# Patient Record
Sex: FEMALE | Race: WHITE | URBAN - METROPOLITAN AREA
[De-identification: names, ages, dates, MRNs, and addresses within clinical notes are randomized per-mention and may not be internally consistent; named-entity substitution may affect disease eponyms.]

---

## 2020-03-10 ENCOUNTER — OFFICE VISIT (OUTPATIENT)
Dept: PRIMARY CARE CLINIC | Age: 55
End: 2020-03-10
Payer: MEDICARE

## 2020-03-10 VITALS
OXYGEN SATURATION: 99 % | DIASTOLIC BLOOD PRESSURE: 81 MMHG | HEART RATE: 78 BPM | BODY MASS INDEX: 31.69 KG/M2 | TEMPERATURE: 99.2 F | SYSTOLIC BLOOD PRESSURE: 151 MMHG | WEIGHT: 190.2 LBS | HEIGHT: 65 IN

## 2020-03-10 PROCEDURE — 99202 OFFICE O/P NEW SF 15 MIN: CPT | Performed by: FAMILY MEDICINE

## 2020-03-10 RX ORDER — LISINOPRIL 20 MG/1
1 TABLET ORAL DAILY
COMMUNITY
Start: 2020-02-28 | End: 2020-03-10 | Stop reason: SDUPTHER

## 2020-03-10 RX ORDER — LISINOPRIL 20 MG/1
20 TABLET ORAL DAILY
Qty: 90 TABLET | Refills: 1 | Status: SHIPPED
Start: 2020-03-10 | End: 2020-03-17 | Stop reason: SINTOL

## 2020-03-10 ASSESSMENT — PATIENT HEALTH QUESTIONNAIRE - PHQ9
SUM OF ALL RESPONSES TO PHQ QUESTIONS 1-9: 0
SUM OF ALL RESPONSES TO PHQ QUESTIONS 1-9: 0
2. FEELING DOWN, DEPRESSED OR HOPELESS: 0
1. LITTLE INTEREST OR PLEASURE IN DOING THINGS: 0
SUM OF ALL RESPONSES TO PHQ9 QUESTIONS 1 & 2: 0

## 2020-03-10 NOTE — PROGRESS NOTES
accurate reading of BP. She just started on lisinopril which I have refilled and will have her follow up in 1 month to re-evaluate. We discussed labs today but she declined stating she would prefer to have them done at next visit. - lisinopril (PRINIVIL;ZESTRIL) 20 MG tablet; Take 1 tablet by mouth daily  Dispense: 90 tablet; Refill: 1    2. Situational anxiety  - Discussed options for treatment for her anxiety all of which she declined. Return in about 4 weeks (around 4/7/2020) for WWE/physical with fasting labs (AM appt).           [address], DO

## 2020-03-11 ASSESSMENT — ENCOUNTER SYMPTOMS
NAUSEA: 0
VOMITING: 0
SHORTNESS OF BREATH: 0
ABDOMINAL PAIN: 0
DIARRHEA: 0

## 2020-03-17 RX ORDER — AMLODIPINE BESYLATE 5 MG/1
5 TABLET ORAL DAILY
Qty: 30 TABLET | Refills: 2 | Status: SHIPPED | OUTPATIENT
Start: 2020-03-17 | End: 2020-05-14 | Stop reason: SDUPTHER

## 2020-03-19 ENCOUNTER — OFFICE VISIT (OUTPATIENT)
Dept: PRIMARY CARE CLINIC | Age: 55
End: 2020-03-19
Payer: MEDICARE

## 2020-03-19 VITALS — DIASTOLIC BLOOD PRESSURE: 92 MMHG | SYSTOLIC BLOOD PRESSURE: 144 MMHG | HEART RATE: 100 BPM

## 2020-03-19 PROCEDURE — 99212 OFFICE O/P EST SF 10 MIN: CPT | Performed by: FAMILY MEDICINE

## 2020-03-19 NOTE — PROGRESS NOTES
[address] [facility] [address] [facility]  [address] [place] [id number]  Dept: [phone]  Dept Fax: [phone]     3/19/2020      Ishmael Willams   1965     Chief Complaint   Patient presents with    Medication Problem     concerned over a reaction to amlodipine and magnesium supplement       HPI  Pt walked in to clinic today very anxious. Reports \"I just took amlodipine and magnesium together and it shot my blood pressure thru the roof. \" She reports she checked it at home and was running 170/100. She is very anxious and agitated. She recently was switched from lisinopril to amlodipine because she was reporting a sore throat with lisinopril and requested it to be switched. She states she read that COVID-19 and lisinopril combination could be \"deadly\" so she stopped it. Has been on the amlodipine x 4 days and has had no side effects. She is very worried that the magnesium increased her blood pressure. She denies any headache, dizziness, chest pain, SOB. Prior to Visit Medications    Medication Sig Taking? Authorizing Provider   amLODIPine (NORVASC) 5 MG tablet Take 1 tablet by mouth daily  Barak Chapman, DO        Past Medical History:   Diagnosis Date    Anxiety     Hypertension         Social History     Tobacco Use    Smoking status: Never Smoker    Smokeless tobacco: Never Used   Substance Use Topics    Alcohol use: Yes     Comment: Rare    Drug use: Never        No past surgical history on file. Allergies   Allergen Reactions    Penicillins         Family History   Problem Relation Age of Onset    High Blood Pressure Mother     High Blood Pressure Father     Heart Disease Father         Patient's past medical history, surgical history, family history, medications, and allergies  were all reviewed and updated as appropriate today. Review of Systems   Constitutional: Negative for fever. HENT: Negative for trouble swallowing.     Respiratory: Negative for cough. Cardiovascular: Negative for chest pain and palpitations. Psychiatric/Behavioral: The patient is nervous/anxious. BP (!) 144/92   Pulse 100      Physical Exam  Vitals signs reviewed. Constitutional:       General: She is not in acute distress. Appearance: Normal appearance. She is well-developed. HENT:      Head: Normocephalic and atraumatic. Right Ear: Tympanic membrane normal.      Left Ear: Tympanic membrane normal.      Nose: Nose normal.      Mouth/Throat:      Mouth: Mucous membranes are moist.   Eyes:      General: No scleral icterus. Conjunctiva/sclera: Conjunctivae normal.      Pupils: Pupils are equal, round, and reactive to light. Neck:      Musculoskeletal: Neck supple. Thyroid: No thyromegaly. Cardiovascular:      Rate and Rhythm: Normal rate and regular rhythm. Heart sounds: No murmur. Pulmonary:      Effort: Pulmonary effort is normal. No respiratory distress. Breath sounds: Normal breath sounds. Musculoskeletal:      Right lower leg: No edema. Left lower leg: No edema. Lymphadenopathy:      Cervical: No cervical adenopathy. Skin:     General: Skin is warm and dry. Capillary Refill: Capillary refill takes less than 2 seconds. Neurological:      General: No focal deficit present. Mental Status: She is alert and oriented to person, place, and time. Mental status is at baseline. Psychiatric:         Mood and Affect: Mood is anxious. Affect is labile. Speech: Speech is rapid and pressured. Assessment:  Encounter Diagnoses   Name Primary?  Essential hypertension Yes    Acute anxiety        Plan:  1. Essential hypertension  - BP is normal in office. Reassured patient she is okay to continue taking her medication. Advised her of some ways to deal with stress/anxiety. She has declined anxiety medication thus far. 2. Acute anxiety  - As above.          Return if symptoms worsen or fail to

## 2020-03-20 ASSESSMENT — ENCOUNTER SYMPTOMS
TROUBLE SWALLOWING: 0
COUGH: 0

## 2020-04-16 ENCOUNTER — TELEPHONE (OUTPATIENT)
Dept: PRIMARY CARE CLINIC | Age: 55
End: 2020-04-16

## 2020-04-16 NOTE — TELEPHONE ENCOUNTER
Patient wants to speak with staff about a bill. States she was seen on 10th and 19th. Wants to know why she was billed for second appointment. States it was to change medication and should have not been billed.

## 2020-05-14 ENCOUNTER — TELEPHONE (OUTPATIENT)
Dept: PRIMARY CARE CLINIC | Age: 55
End: 2020-05-14

## 2020-05-14 RX ORDER — AMLODIPINE BESYLATE 5 MG/1
5 TABLET ORAL DAILY
Qty: 30 TABLET | Refills: 2 | Status: SHIPPED | OUTPATIENT
Start: 2020-05-14

## 2020-05-14 NOTE — TELEPHONE ENCOUNTER
Unfortunately, the multitude of these issues are not able to be addressed in a video visit. If she is having severe abdominal pain, then she needs to be routed to the ER. If her concerns are less urgent, then we can arrange a visit at a [place] [facility] with me when I am seeing patients in June.

## 2020-06-04 ENCOUNTER — VIRTUAL VISIT (OUTPATIENT)
Dept: PRIMARY CARE CLINIC | Age: 55
End: 2020-06-04
Payer: COMMERCIAL

## 2020-06-04 ENCOUNTER — TELEPHONE (OUTPATIENT)
Dept: PRIMARY CARE CLINIC | Age: 55
End: 2020-06-04

## 2020-06-04 PROCEDURE — 99213 OFFICE O/P EST LOW 20 MIN: CPT | Performed by: FAMILY MEDICINE
